# Patient Record
Sex: MALE | Race: WHITE | Employment: UNEMPLOYED | URBAN - METROPOLITAN AREA
[De-identification: names, ages, dates, MRNs, and addresses within clinical notes are randomized per-mention and may not be internally consistent; named-entity substitution may affect disease eponyms.]

---

## 2021-06-16 ENCOUNTER — TRANSCRIBE ORDER (OUTPATIENT)
Dept: SCHEDULING | Age: 51
End: 2021-06-16

## 2021-06-16 DIAGNOSIS — R60.9 EDEMA: Primary | ICD-10-CM

## 2021-08-02 ENCOUNTER — HOSPITAL ENCOUNTER (OUTPATIENT)
Dept: NON INVASIVE DIAGNOSTICS | Age: 51
Discharge: HOME | End: 2021-08-02
Attending: FAMILY MEDICINE

## 2021-08-02 VITALS
BODY MASS INDEX: 37.89 KG/M2 | DIASTOLIC BLOOD PRESSURE: 66 MMHG | HEIGHT: 68 IN | SYSTOLIC BLOOD PRESSURE: 140 MMHG | WEIGHT: 250 LBS

## 2021-08-02 DIAGNOSIS — R60.9 EDEMA: ICD-10-CM

## 2021-08-02 LAB
ECHO AO ROOT DIAM: 2.9 CM
ECHO AV AREA PEAK VELOCITY: 2.52 CM2
ECHO AV AREA/BSA PEAK VELOCITY: 1.1 CM2/M2
ECHO AV PEAK GRADIENT: 8 MMHG
ECHO AV PEAK VELOCITY: 143 CM/S
ECHO LA AREA 2C: 21.5 CM2
ECHO LA AREA 4C: 18.5 CM2
ECHO LA MAJOR AXIS: 5.1 CM
ECHO LA MINOR AXIS: 2.27 CM
ECHO LV E' LATERAL VELOCITY: 14.2 CM/S
ECHO LV E' SEPTAL VELOCITY: 14.2 CM/S
ECHO LV E' SEPTAL VELOCITY: 7.7 CM/S
ECHO LV E' SEPTAL VELOCITY: 7.7 CM/S
ECHO LV INTERNAL DIMENSION DIASTOLIC: 5.64 CM (ref 4.2–5.9)
ECHO LV INTERNAL DIMENSION SYSTOLIC: 3.73 CM
ECHO LV IVSD: 1.02 CM (ref 0.6–1)
ECHO LV MASS 2D: 243.1 G (ref 88–224)
ECHO LV MASS INDEX 2D: 108.1 G/M2 (ref 49–115)
ECHO LV POSTERIOR WALL DIASTOLIC: 1.12 CM (ref 0.6–1)
ECHO LVOT DIAM: 2.1 CM
ECHO LVOT PEAK GRADIENT: 4 MMHG
ECHO MV A VELOCITY: 90.8 CM/S
ECHO MV E DECELERATION TIME (DT): 187 MS
ECHO MV E VELOCITY: 90.8 CM/S
ECHO MV E/A RATIO: 1
ECHO MV E/E' LATERAL: 6.39
ECHO RV INTERNAL DIMENSION: 1.02 CM
ECHO RV TAPSE: 1.7 CM (ref 1.5–2)

## 2021-08-02 PROCEDURE — 74011000250 HC RX REV CODE- 250: Performed by: INTERNAL MEDICINE

## 2021-08-02 PROCEDURE — 74011250636 HC RX REV CODE- 250/636: Performed by: INTERNAL MEDICINE

## 2021-08-02 PROCEDURE — C8929 TTE W OR WO FOL WCON,DOPPLER: HCPCS

## 2021-08-02 RX ADMIN — PERFLUTREN 1 ML: 6.52 INJECTION, SUSPENSION INTRAVENOUS at 13:41

## 2025-03-10 ENCOUNTER — HOSPITAL ENCOUNTER (OUTPATIENT)
Dept: SLEEP CENTER | Age: 55
Discharge: HOME | End: 2025-03-12

## 2025-04-29 DIAGNOSIS — G47.33 OSA (OBSTRUCTIVE SLEEP APNEA): Primary | ICD-10-CM

## 2025-04-29 NOTE — PROGRESS NOTES
Patient had recent sleep study that showed sleep apnea and hypoxemia. AHI-51.5      Lowest SaO2- 77%. Per Interp patient will need a cpap titration. Sleep lab will call the patient to schedule titration or the patient can call the sleep lab at [phone] to schedule.

## 2025-05-24 ENCOUNTER — HOSPITAL ENCOUNTER (OUTPATIENT)
Dept: SLEEP CENTER | Age: 55
Discharge: HOME | End: 2025-05-26